# Patient Record
Sex: FEMALE | Race: WHITE | NOT HISPANIC OR LATINO | URBAN - METROPOLITAN AREA
[De-identification: names, ages, dates, MRNs, and addresses within clinical notes are randomized per-mention and may not be internally consistent; named-entity substitution may affect disease eponyms.]

---

## 2020-05-14 ENCOUNTER — OFFICE VISIT CONVERTED (OUTPATIENT)
Dept: INTERNAL MEDICINE | Facility: CLINIC | Age: 30
End: 2020-05-14
Attending: PHYSICIAN ASSISTANT

## 2020-08-10 ENCOUNTER — CONVERSION ENCOUNTER (OUTPATIENT)
Dept: INTERNAL MEDICINE | Facility: CLINIC | Age: 30
End: 2020-08-10

## 2020-10-28 ENCOUNTER — TELEMEDICINE CONVERTED (OUTPATIENT)
Dept: INTERNAL MEDICINE | Facility: CLINIC | Age: 30
End: 2020-10-28
Attending: PHYSICIAN ASSISTANT

## 2021-03-31 ENCOUNTER — HOSPITAL ENCOUNTER (OUTPATIENT)
Dept: OTHER | Facility: HOSPITAL | Age: 31
Discharge: HOME | End: 2021-03-31
Attending: PHYSICIAN ASSISTANT

## 2021-03-31 ENCOUNTER — CONVERSION ENCOUNTER (OUTPATIENT)
Dept: INTERNAL MEDICINE | Facility: CLINIC | Age: 31
End: 2021-03-31

## 2021-03-31 ENCOUNTER — OFFICE VISIT CONVERTED (OUTPATIENT)
Dept: INTERNAL MEDICINE | Facility: CLINIC | Age: 31
End: 2021-03-31
Attending: PHYSICIAN ASSISTANT

## 2021-03-31 LAB
ALBUMIN SERPL-MCNC: 4.2 G/DL (ref 3.5–5)
ALBUMIN/GLOB SERPL: 1.4 {RATIO} (ref 1.4–2.6)
ALP SERPL-CCNC: 67 U/L (ref 42–98)
ALT SERPL-CCNC: 21 U/L (ref 10–40)
ANION GAP SERPL CALC-SCNC: 18 MMOL/L (ref 8–19)
AST SERPL-CCNC: 26 U/L (ref 15–50)
BASOPHILS # BLD AUTO: 0.04 10*3/UL (ref 0–0.2)
BASOPHILS NFR BLD AUTO: 0.5 % (ref 0–3)
BILIRUB SERPL-MCNC: 0.36 MG/DL (ref 0.2–1.3)
BUN SERPL-MCNC: 7 MG/DL (ref 5–25)
BUN/CREAT SERPL: 9 {RATIO} (ref 6–20)
CALCIUM SERPL-MCNC: 9.3 MG/DL (ref 8.7–10.4)
CHLORIDE SERPL-SCNC: 103 MMOL/L (ref 99–111)
CHOLEST SERPL-MCNC: 239 MG/DL (ref 107–200)
CHOLEST/HDLC SERPL: 2.7 {RATIO} (ref 3–6)
CONV ABS IMM GRAN: 0.02 10*3/UL (ref 0–0.2)
CONV CO2: 21 MMOL/L (ref 22–32)
CONV IMMATURE GRAN: 0.3 % (ref 0–1.8)
CONV TOTAL PROTEIN: 7.3 G/DL (ref 6.3–8.2)
CREAT UR-MCNC: 0.81 MG/DL (ref 0.5–0.9)
DEPRECATED RDW RBC AUTO: 41.9 FL (ref 36.4–46.3)
EOSINOPHIL # BLD AUTO: 0.31 10*3/UL (ref 0–0.7)
EOSINOPHIL # BLD AUTO: 4.2 % (ref 0–7)
ERYTHROCYTE [DISTWIDTH] IN BLOOD BY AUTOMATED COUNT: 12.1 % (ref 11.7–14.4)
GFR SERPLBLD BASED ON 1.73 SQ M-ARVRAT: >60 ML/MIN/{1.73_M2}
GLOBULIN UR ELPH-MCNC: 3.1 G/DL (ref 2–3.5)
GLUCOSE SERPL-MCNC: 83 MG/DL (ref 65–99)
HCG UR QL: NEGATIVE
HCT VFR BLD AUTO: 42 % (ref 37–47)
HDLC SERPL-MCNC: 87 MG/DL (ref 40–60)
HGB BLD-MCNC: 14.2 G/DL (ref 12–16)
LDLC SERPL CALC-MCNC: 130 MG/DL (ref 70–100)
LYMPHOCYTES # BLD AUTO: 2.52 10*3/UL (ref 1–5)
LYMPHOCYTES NFR BLD AUTO: 34.2 % (ref 20–45)
MCH RBC QN AUTO: 31.3 PG (ref 27–31)
MCHC RBC AUTO-ENTMCNC: 33.8 G/DL (ref 33–37)
MCV RBC AUTO: 92.5 FL (ref 81–99)
MONOCYTES # BLD AUTO: 0.51 10*3/UL (ref 0.2–1.2)
MONOCYTES NFR BLD AUTO: 6.9 % (ref 3–10)
NEUTROPHILS # BLD AUTO: 3.96 10*3/UL (ref 2–8)
NEUTROPHILS NFR BLD AUTO: 53.9 % (ref 30–85)
NRBC CBCN: 0 % (ref 0–0.7)
OSMOLALITY SERPL CALC.SUM OF ELEC: 283 MOSM/KG (ref 273–304)
PLATELET # BLD AUTO: 270 10*3/UL (ref 130–400)
PMV BLD AUTO: 10.8 FL (ref 9.4–12.3)
POTASSIUM SERPL-SCNC: 3.9 MMOL/L (ref 3.5–5.3)
RBC # BLD AUTO: 4.54 10*6/UL (ref 4.2–5.4)
SODIUM SERPL-SCNC: 138 MMOL/L (ref 135–147)
TRIGL SERPL-MCNC: 110 MG/DL (ref 40–150)
TSH SERPL-ACNC: 1.89 M[IU]/L (ref 0.27–4.2)
VLDLC SERPL-MCNC: 22 MG/DL (ref 5–37)
WBC # BLD AUTO: 7.36 10*3/UL (ref 4.8–10.8)

## 2021-05-04 ENCOUNTER — CONVERSION ENCOUNTER (OUTPATIENT)
Dept: INTERNAL MEDICINE | Facility: CLINIC | Age: 31
End: 2021-05-04

## 2021-05-04 ENCOUNTER — OFFICE VISIT CONVERTED (OUTPATIENT)
Dept: INTERNAL MEDICINE | Facility: CLINIC | Age: 31
End: 2021-05-04
Attending: PHYSICIAN ASSISTANT

## 2021-05-10 NOTE — H&P
History and Physical      Patient Name: Angle Solano   Patient ID: [id number]   Sex: Female   YOB: 1990    Primary Care Provider: Mara Quiros PA-C    Visit Date: May 14, 2020    Provider: Mara Quiros PA-C   Location: [facility]   Location Address: [address] [place] [address], [address]  [place], [place]  [id number]   Location Phone: [phone]          Chief Complaint  · establish of care      History Of Present Illness  Angle Solano is a 30 year old female who presents for evaluation and treatment of:      [facility]  Has been on depo since 2018. Denies issues with medication.  She does not have period on depo.   Denies hx of blood clots in legs, migraines, seizures.  she is interested in Nexplanon.      Pt smoking 1/2-1 PPD. She is not ready to quit.    Allergies: controlled on allegra.   Previous doing allergy injections but stopped because [place] get on [place]  Sx have been ok recently    GERD: has severe sx if she misses Prilosec dose.          Past Medical History  Reviewed None Changed         Past Surgical History  Procedure Name Date Notes   Rhinoplasty 2009 polyp and deviated septum repaired         Medication List  Name Date Started Instructions   Allegra Allergy 180 mg oral tablet 05/14/2020 take 1 tablet (180 mg) by oral route once daily for 90 days         Allergy List  Allergen Name Date Reaction Notes   NO KNOWN DRUG ALLERGIES --  --  --        Allergies Reconciled  Family Medical History  Disease Name Relative/Age Notes   Hypertension Father/  Grandfather (paternal)/   --    Heart Attack (MI) Father/   --          Social History  Finding Status Start/Stop Quantity Notes   Smokes 1 pack of cigarettes per day --  --/-- --  --    Tobacco use --  --/-- --  1PPD         Review of Systems  · Constitutional  o Denies  o : fatigue, night sweats  · Eyes  o Denies  o : double vision, blurred vision  · HENT  o Denies  o : vertigo, recent head  "injury  · Cardiovascular  o Denies  o : chest pain, irregular heart beats  · Respiratory  o Denies  o : shortness of breath, productive cough  · Gastrointestinal  o Admits  o : heartburn, reflux  o Denies  o : nausea, vomiting  · Genitourinary  o Denies  o : dysuria, urinary retention  · Integument  o Denies  o : hair growth change, new skin lesions  · Neurologic  o Denies  o : altered mental status, seizures  · Musculoskeletal  o Denies  o : joint swelling, limitation of motion  · Endocrine  o Denies  o : cold intolerance, heat intolerance  · Heme-Lymph  o Denies  o : petechiae, lymph node enlargement or tenderness  · Allergic-Immunologic  o Admits  o : sinus allergy symptoms  o Denies  o : frequent illnesses      Vitals  Date Time BP Position Site L\R Cuff Size HR RR TEMP (F) WT  HT  BMI kg/m2 BSA m2 O2 Sat        05/14/2020 01:53 /74 Sitting    84 - R  97.8 133lbs 6oz 5'  2\" 24.39 1.63 99 %          Physical Examination  · Constitutional  o Appearance  o : no acute distress, well-nourished  · Head and Face  o Head  o :   § Inspection  § : atraumatic, normocephalic  · Eyes  o Eyes  o : extraocular movements intact, no scleral icterus, no conjunctival injection  · Ears, Nose, Mouth and Throat  o Ears  o :   § External Ears  § : normal  § Otoscopic Examination  § : tympanic membrane appearance within normal limits bilaterally  o Nose  o :   § Intranasal Exam  § : nares patent  o Oral Cavity  o :   § Oral Mucosa  § : moist mucous membranes  o Throat  o :   § Oropharynx  § : no inflammation or lesions present, tonsils within normal limits  · Neck  o Thyroid  o : gland size normal, nontender, no nodules or masses present on palpation, symmetric  · Respiratory  o Respiratory Effort  o : breathing comfortably, symmetric chest rise  o Auscultation of Lungs  o : clear to asculatation bilaterally, no wheezes, rales, or rhonchii  · Cardiovascular  o Heart  o :   § Auscultation of Heart  § : regular rate and rhythm, " no murmurs, rubs, or gallops  · Gastrointestinal  o Abdominal Examination  o :   § Abdomen  § : bowel sounds present, non-distended, non-tender  · Lymphatic  o Neck  o : no lymphadenopathy present  o Supraclavicular Nodes  o : no supraclavicular nodes  · Skin and Subcutaneous Tissue  o General Inspection  o : no lesions present, no areas of discoloration, skin turgor normal  · Neurologic  o Mental Status Examination  o :   § Orientation  § : grossly oriented to person, place and time  o Gait and Station  o :   § Gait Screening  § : normal gait  · Psychiatric  o General  o : normal mood and affect          Results  · In-Office Procedures  o Lab procedure  § Urine pregnancy test (16602)   § B-HCG Ur Ql: Negative   § Internal Control Verified?: Yes       Assessment  · Screening for depression     V79.0/Z13.89  negative  · Allergic rhinitis due to allergen     477.9/J30.9  Cont current meds  · GERD (gastroesophageal reflux disease)     530.81/K21.9  Discussed GERD symptoms, GERD diet, lifestyle changes (elevate head of bed, limit meals late at night, smaller more frequent meals). Encouraged food diary, avoid trigger foods.   · Nicotine dependence     305.1/F17.200  Discussed risks of smoking with patient including death, bp elevation, mi, stroke, CKD, blindness, slow wound healing. Pt is not ready to quit smoking at this time, encouraged to RTC once ready to quit.  · Contraception management     V25.9/Z30.9  Discussed different birth control options and side effects. Discussed risks of long term use of depo. Will give injection today. pt would like Nexplanon, rtc 3 months to insert Nexplanon. Pt understands and agrees    Problems Reconciled  Plan  · Orders  o Smoking cessation counseling, 3-10 minutes [facility] (23315) - 305.1/F17.200 - 05/14/2020  o ACO-17: Screened for tobacco use AND received tobacco cessation intervention (3914F) - 305.1/F17.200 - 05/14/2020  o IM/SQ - Injection Fee [facility] (19012) - - 05/15/2020  o ACO-39:  Current medications updated and reviewed () - - 05/14/2020  o ACO-18: Negative screen for clinical depression using a standardized tool () - - 05/15/2020  o 150.00 - Depo Provera 150mg (-15) - - 05/15/2020  · Medications  o omeprazole 10 mg oral capsule,delayed release(DR/EC)   SIG: take 1 capsule by oral route daily for 90 days   DISP: (90) capsules with 1 refills  Prescribed on 05/14/2020     o Allegra Allergy 180 mg oral tablet   SIG: take 1 tablet (180 mg) by oral route once daily for 90 days   DISP: (90) tablets with 1 refills  Prescribed on 05/14/2020     o Prilosec 10 mg oral susp,delayed release for recon   SIG: ---   DISP: (0) milligram with 0 refills  Discontinued on 05/14/2020     o Medications have been Reconciled  o Transition of Care or Provider Policy  · Instructions  o Depression Screen completed and scanned into the EMR under the designated folder within the patient's documents.  o Today's PHQ-9 result is 1  o *Form of nicotine being used: cigarette  o Patient was strongly encouraged to discontinue use of any nicotine containing product or minimize the use of the product.  o Patient was educated/instructed on their diagnosis, treatment and medications prior to discharge from the clinic today.  · Disposition  o Call or Return if symptoms worsen or persist.  o Follow up in 3 months            Electronically Signed by: Mara Quiros PA-C -Author on May 16, 2020 09:20:34 AM  Electronically Co-signed by: Viri Hutton MD -Reviewer on May 18, 2020 08:18:10 AM

## 2021-05-13 NOTE — PROGRESS NOTES
Progress Note      Patient Name: Angel Solano   Patient ID: [id number]   Sex: Female   YOB: 1990    Primary Care Provider: Mara Quiros PA-C    Visit Date: October 28, 2020    Provider: Comfort Conte PA-C   Location: [facility]   Location Address: [address] [place] [address], [facility] [address]  [place], [place]  [id number]   Location Phone: [phone]          Chief Complaint  · birth control      History Of Present Illness  Video Conferencing Visit  Angle Solano is a 30 year old female who is presenting for evaluation via video conferencing via [employer]. Verbal consent obtained before beginning visit.   The following staff were present during this visit: Comfort Conte PA-C   Angle Solano is a 30 year old female who presents for evaluation and treatment of:      birth control.  Patient is interested in OCP as she waits to get Nexplanon.  Patient states that she is still smoking and not interested in quitting.       Past Surgical History  Procedure Name Date Notes   Rhinoplasty 2009 polyp and deviated septum repaired         Medication List  Name Date Started Instructions   Allegra Allergy 180 mg oral tablet 05/14/2020 take 1 tablet (180 mg) by oral route once daily for 90 days   omeprazole 10 mg oral capsule,delayed release(/EC) 05/14/2020 take 1 capsule by oral route daily for 90 days         Allergy List  Allergen Name Date Reaction Notes   NO KNOWN DRUG ALLERGIES --  --  --        Allergies Reconciled  Family Medical History  Disease Name Relative/Age Notes   Hypertension Father/  Grandfather (paternal)/   --    Heart Attack (MI) Father/   --          Social History  Finding Status Start/Stop Quantity Notes   Smokes 1 pack of cigarettes per day --  --/-- --  --    Tobacco use --  --/-- --  1PPD         Review of Systems  · Constitutional  o Denies  o : fever, fatigue, weight loss, weight gain  · Cardiovascular  o Denies  o : lower extremity edema, claudication, chest pressure,  palpitations  · Respiratory  o Denies  o : shortness of breath, wheezing, frequent cough, hemoptysis, dyspnea on exertion  · Gastrointestinal  o Denies  o : nausea, vomiting, diarrhea, constipation, abdominal pain      Physical Examination     Gen: well-nourished, no acute distress  HENT: atraumatic, normocephalic  Eyes: extraocular movements intact, no scleral icterus  Lung: breathing comfortably, no cough  Skin: no visible rash, no lesions  Neuro: grossly oriented to person, place, and time. no facial droop   Psych: normal mood and affect           Assessment  · Contraceptive management     V25.9/Z30.9  Discussed risk of blood clot due to smoking and age risk factors with OCP. Patient understands and would like to continue with OCP until nexplanon can be implanted.    Problems Reconciled  Plan  · Orders  o ACO-39: Current medications updated and reviewed (1159F, ) - - 10/28/2020  · Medications  o LoSeasonique 0.10 mg-20 mcg (84)/10 mcg (7) oral tablets,dose pack,3 month   SIG: take 1 tablet by oral route once daily for 90 days   DISP: (90) Tablet with 0 refills  Prescribed on 10/28/2020     o Medications have been Reconciled  o Transition of Care or Provider Policy  · Instructions  o Take all medications as prescribed/directed.  o Patient was educated/instructed on their diagnosis, treatment and medications prior to discharge from the clinic today.  o Patient instructed to seek medical attention urgently for new or worsening symptoms.  o Call the office with any concerns or questions.  · Disposition  o Call or Return if symptoms worsen or persist.  o follow up as needed  o Medications sent electronically to pharmacy            Electronically Signed by: Comfort Conte PA-C -Author on October 29, 2020 08:14:37 AM

## 2021-05-14 VITALS
OXYGEN SATURATION: 100 % | TEMPERATURE: 97.8 F | SYSTOLIC BLOOD PRESSURE: 124 MMHG | BODY MASS INDEX: 24.66 KG/M2 | HEIGHT: 62 IN | WEIGHT: 134 LBS | DIASTOLIC BLOOD PRESSURE: 82 MMHG | HEART RATE: 82 BPM

## 2021-05-14 NOTE — PROGRESS NOTES
"   Progress Note      Patient Name: Angle Solano   Patient ID: [id number]   Sex: Female   YOB: 1990    Primary Care Provider: Mara Quiros PA-C    Visit Date: March 31, 2021    Provider: Comfort Conte PA-C   Location: [facility]   Location Address: [address] [place] [address], [facility]  [place], [place]  [id number]   Location Phone: [phone]          Chief Complaint  · Follow-up  · \"wants to change her BC\"      History Of Present Illness  Angle Solano is a 30 year old female who presents for evaluation and treatment of:      OCP- does not do well taking it regularly, emotionally not as stable as she would like to be while taking medication.  Will sometimes have hot flashes.  3 years since last pap.    Smoking- currently smoking 1/2 ppd.  Admits to stressors at work and home.  Pt is interested in starting anti-depressive medication at this time.  She denies SI/HI.               Past Surgical History  Procedure Name Date Notes   Rhinoplasty 2009 polyp and deviated septum repaired         Medication List  Name Date Started Instructions   Allegra Allergy 180 mg oral tablet 01/05/2021 take 1 tablet (180 mg) by oral route once daily for 90 days   Jolessa 0.15 mg-30 mcg (91) oral tablets,dose pack,3 month 11/06/2020 take 1 tablet by oral route once daily for 90 days   omeprazole 10 mg oral capsule,delayed release(/EC) 01/05/2021 take 1 capsule by oral route daily for 90 days         Allergy List  Allergen Name Date Reaction Notes   NO KNOWN DRUG ALLERGIES --  --  --        Allergies Reconciled  Family Medical History  Disease Name Relative/Age Notes   Hypertension Father/  Grandfather (paternal)/   --    Heart Attack (MI) Father/   --          Social History  Finding Status Start/Stop Quantity Notes   Smokes 1 pack of cigarettes per day --  --/-- --  --    Tobacco use --  --/-- --  1PPD         Review of Systems  · Constitutional  o Denies  o : fever, fatigue, weight loss, weight " "gain  · Cardiovascular  o Denies  o : lower extremity edema, claudication, chest pressure, palpitations  · Respiratory  o Denies  o : shortness of breath, wheezing, cough, hemoptysis, dyspnea on exertion  · Gastrointestinal  o Denies  o : nausea, vomiting, diarrhea, constipation, abdominal pain      Vitals  Date Time BP Position Site L\R Cuff Size HR RR TEMP (F) WT  HT  BMI kg/m2 BSA m2 O2 Sat FR L/min FiO2        03/31/2021 01:31 /82 Sitting    82 - R  97.8 134lbs 0oz 5'  2\" 24.51 1.63 100 %  21%          Physical Examination  · Constitutional  o Appearance  o : no acute distress, well-nourished  · Head and Face  o Head  o :   § Inspection  § : atraumatic, normocephalic  · Eyes  o Eyes  o : extraocular movements intact, no scleral icterus, no conjunctival injection  · Ears, Nose, Mouth and Throat  o Ears  o :   § External Ears  § : normal  § Otoscopic Examination  § : tympanic membrane appearance within normal limits bilaterally  o Nose  o :   § Intranasal Exam  § : nares patent  o Oral Cavity  o :   § Oral Mucosa  § : moist mucous membranes  · Respiratory  o Respiratory Effort  o : breathing comfortably, symmetric chest rise  o Auscultation of Lungs  o : clear to asculatation bilaterally, no wheezes, rales, or rhonchii  · Cardiovascular  o Heart  o :   § Auscultation of Heart  § : regular rate and rhythm, no murmurs, rubs, or gallops  o Peripheral Vascular System  o :   § Extremities  § : no edema  · Lymphatic  o Neck  o : no lymphadenopathy present  · Skin and Subcutaneous Tissue  o General Inspection  o : no lesions present, no areas of discoloration, skin turgor normal  · Neurologic  o Mental Status Examination  o :   § Orientation  § : grossly oriented to person, place and time  o Gait and Station  o :   § Gait Screening  § : normal gait  · Psychiatric  o General  o : normal mood and affect          Results  · In-Office Procedures  o Lab procedure  § IOP - Urine Pregnancy Test (65061)   § B-HCG Ur Ql: " Negative   § Internal Control Verified?: Yes       Assessment  · Anxiety disorder     300.00/F41.9  Will go ahead and start patient on Zoloft. Discussed risk of SI while starting medication. Pt is to call our office or suicide hotline if this happens. List of local counselors given to patient. Will have her follow up in 1 month.  · Nicotine dependence     305.1/F17.200  Recommend decreasing cigarettes by 1 each week while weaning down smoking. Smoking cessation possibly may improve if we could get stress under more manageable level.  · Oral contraceptive use     V25.41/Z30.41  Will try the patch at this time but patient is interested in IUD so will send her to GYN to discuss this and for pap smear. Discussed risk of blood clots while smoking and on HRT. Patient voiced understanding.      Plan  · Orders  o ACO-17: Screened for tobacco use AND received tobacco cessation intervention (4004F) - 305.1/F17.200 - 03/31/2021  o Physical, Primary Care Panel (CBC, CMP, Lipid, TSH) [facility] (32861, 66644, 92846, 73848) - 305.1/F17.200, V25.41/Z30.41 - 03/31/2021   Non-fasting  o ACO-39: Current medications updated and reviewed (1159F, ) - - 03/31/2021  o OB/GYN CONSULTATION (OBGYN) - V25.41/Z30.41 - 03/31/2021  · Medications  o Zoloft 25 mg oral tablet   SIG: take 1 tablet (25 mg) by oral route once daily for 30 days   DISP: (30) Tablet with 1 refills  Prescribed on 03/31/2021     o Xulane 150-35 mcg/24 hr transdermal patch weekly   SIG: apply 1 patch by transdermal route once weekly for 3 weeks   DISP: (3) Patch with 1 refills  Prescribed on 03/31/2021     o Medications have been Reconciled  o Transition of Care or Provider Policy  · Instructions  o Discussed the need for therapy, either with a certified counselor, psychologist, and/or family . If no improvement is noted or worsening of their condition, return to office or ER. But also discussed with patient that if they are non-responsive to the type of medication  they may need to see a psychiatrist for further evaluation and management.  o Patient was given an SSRI/SSNRI medication and warned of possible side effects of the medication including potential for increased risk of suicidal thoughts and feelings. Patient was instructed that if they begin to exhibit any of these effects they will discontinue the medication immediately and contact our office or the ER ASAP.  o *Form of nicotine being used:   o Patient was strongly encouraged to discontinue use of any nicotine containing product or minimize the use of the product.  o Take all medications as prescribed/directed.  o Patient was educated/instructed on their diagnosis, treatment and medications prior to discharge from the clinic today.  o Patient instructed to seek medical attention urgently for new or worsening symptoms.  o Call the office with any concerns or questions.  · Disposition  o Call or Return if symptoms worsen or persist.  o follow up in 1 month  o Medications sent electronically to pharmacy  o Labs drawn in clinic            Electronically Signed by: Comfort Conte PA-C -Author on March 31, 2021 09:45:55 PM

## 2021-05-15 VITALS
TEMPERATURE: 97.8 F | WEIGHT: 133.37 LBS | HEART RATE: 84 BPM | OXYGEN SATURATION: 99 % | DIASTOLIC BLOOD PRESSURE: 74 MMHG | SYSTOLIC BLOOD PRESSURE: 120 MMHG | HEIGHT: 62 IN | BODY MASS INDEX: 24.54 KG/M2

## 2021-05-15 VITALS
SYSTOLIC BLOOD PRESSURE: 118 MMHG | TEMPERATURE: 98.3 F | DIASTOLIC BLOOD PRESSURE: 78 MMHG | BODY MASS INDEX: 24.2 KG/M2 | WEIGHT: 131.5 LBS | HEIGHT: 62 IN | OXYGEN SATURATION: 96 % | HEART RATE: 98 BPM

## 2021-05-26 PROBLEM — R05.9 COUGH: Status: ACTIVE | Noted: 2021-05-26

## 2021-06-06 NOTE — PROGRESS NOTES
"   Progress Note      Patient Name: Angle Solano   Patient ID: [id number]   Sex: Female   YOB: 1990    Primary Care Provider: Mara Quiros PA-C    Visit Date: May 4, 2021    Provider: Comfort Conte PA-C   Location: [facility]   Location Address: [address] [place] [address] 3  [place], [place]  [id number]   Location Phone: [phone]          Chief Complaint  · Follow-up  · No concerns, refills on b.c and zoloft  · \"zoloft seems to be helping\"      History Of Present Illness  Angle Solano is a 31 year old female who presents for evaluation and treatment of:      Zoloft- doing well on 25mg, would like to continue this for now.  Occasionally will have issues with increased stress at work but is able to focus more at work.  No SI/HI.     Birth control- doing well, discussed risk of blood clots while on OCP and smoking.    smoking 10 cig daily, interested in quitting and would like to try lozenges.       Past Surgical History  Procedure Name Date Notes   Rhinoplasty 2009 polyp and deviated septum repaired         Medication List  Name Date Started Instructions   Allegra Allergy 180 mg oral tablet 04/14/2021 take 1 tablet (180 mg) by oral route once daily for 90 days   Jolessa 0.15 mg-30 mcg (91) oral tablets,dose pack,3 month 06/02/2021 take 1 tablet by oral route once daily for 90 days   omeprazole 10 mg oral capsule,delayed release(DR/EC) 04/14/2021 take 1 capsule by oral route daily for 90 days   Xulane 150-35 mcg/24 hr transdermal patch weekly 06/02/2021 apply 1 patch by transdermal route once weekly for 3 weeks   Zoloft 25 mg oral tablet 03/31/2021 take 1 tablet (25 mg) by oral route once daily for 30 days         Allergy List  Allergen Name Date Reaction Notes   NO KNOWN DRUG ALLERGIES --  --  --          Family Medical History  Disease Name Relative/Age Notes   Hypertension Father/  Grandfather (paternal)/   --    Heart Attack (MI) Father/   --          Social History  Finding " "Status Start/Stop Quantity Notes   Smokes 1 pack of cigarettes per day --  --/-- --  --    Tobacco use --  --/-- --  1PPD         Review of Systems  · Constitutional  o Denies  o : fever, fatigue, weight loss, weight gain  · Cardiovascular  o Denies  o : lower extremity edema, claudication, chest pressure, palpitations  · Respiratory  o Denies  o : shortness of breath, wheezing, cough, hemoptysis, dyspnea on exertion  · Gastrointestinal  o Denies  o : nausea, vomiting, diarrhea, constipation, abdominal pain      Vitals  Date Time BP Position Site L\R Cuff Size HR RR TEMP (F) WT  HT  BMI kg/m2 BSA m2 O2 Sat FR L/min FiO2 HC       05/04/2021 03:03 /70 Sitting    93 - R  98.2 126lbs 16oz 5'  2\" 23.23 1.59 98 %  21%          Physical Examination  · Constitutional  o Appearance  o : no acute distress, well-nourished  · Head and Face  o Head  o :   § Inspection  § : atraumatic, normocephalic  · Eyes  o Eyes  o : extraocular movements intact, no scleral icterus, no conjunctival injection  · Ears, Nose, Mouth and Throat  o Ears  o :   § External Ears  § : normal  o Nose  o :   § Intranasal Exam  § : nares patent  o Oral Cavity  o :   § Oral Mucosa  § : moist mucous membranes  · Respiratory  o Respiratory Effort  o : breathing comfortably, symmetric chest rise  o Auscultation of Lungs  o : clear to asculatation bilaterally, no wheezes, rales, or rhonchii  · Cardiovascular  o Heart  o :   § Auscultation of Heart  § : regular rate and rhythm, no murmurs, rubs, or gallops  o Peripheral Vascular System  o :   § Extremities  § : no edema  · Lymphatic  o Neck  o : no lymphadenopathy present  · Skin and Subcutaneous Tissue  o General Inspection  o : no lesions present, no areas of discoloration, skin turgor normal  · Neurologic  o Mental Status Examination  o :   § Orientation  § : grossly oriented to person, place and time  o Gait and Station  o :   § Gait Screening  § : normal gait  · Psychiatric  o General  o : normal mood " and affect          Assessment  · Anxiety disorder     300.00/F41.9  Doing well with Zoloft, will continue this for now. Could consider increasing dose if needed.   · Nicotine dependence     305.1/F17.200  Will send in lozenges. Encouraged pt do decrease cigarettes daily. Smoking cessation goal discussed.       Plan  · Orders  o ACO-17: Screened for tobacco use AND received tobacco cessation intervention (4004F) - 305.1/F17.200 - 05/04/2021  o ACO-39: Current medications updated and reviewed (, 1159F) - - 05/04/2021  · Medications  o Nicorette 2 mg buccal lozenge   SIG: place 1 tablet (2 mg) by buccal route every 1-2 hours PRN   DISP: (300) Lozenge with 0 refills  Prescribed on 05/04/2021     o Medications have been Reconciled  o Transition of Care or Provider Policy  · Instructions  o Discussed the need for therapy, either with a certified counselor, psychologist, and/or family . If no improvement is noted or worsening of their condition, return to office or ER. But also discussed with patient that if they are non-responsive to the type of medication they may need to see a psychiatrist for further evaluation and management.  o Patient was given an SSRI/SSNRI medication and warned of possible side effects of the medication including potential for increased risk of suicidal thoughts and feelings. Patient was instructed that if they begin to exhibit any of these effects they will discontinue the medication immediately and contact our office or the ER ASAP.  o *Form of nicotine being used:   o Patient was strongly encouraged to discontinue use of any nicotine containing product or minimize the use of the product.  o Take all medications as prescribed/directed.  o Patient was educated/instructed on their diagnosis, treatment and medications prior to discharge from the clinic today.  o Patient instructed to seek medical attention urgently for new or worsening symptoms.  o Call the office with any concerns or  questions.  · Disposition  o follow up in 1 month            Electronically Signed by: Comfort Conte PA-C -Author on Dodie 3, 2021 01:04:08 PM

## 2021-06-08 ENCOUNTER — OFFICE VISIT (OUTPATIENT)
Dept: INTERNAL MEDICINE | Facility: CLINIC | Age: 31
End: 2021-06-08

## 2021-06-08 VITALS
TEMPERATURE: 97.8 F | BODY MASS INDEX: 24.44 KG/M2 | HEART RATE: 57 BPM | SYSTOLIC BLOOD PRESSURE: 116 MMHG | HEIGHT: 62 IN | DIASTOLIC BLOOD PRESSURE: 66 MMHG | OXYGEN SATURATION: 98 % | WEIGHT: 132.8 LBS

## 2021-06-08 DIAGNOSIS — K21.9 GASTROESOPHAGEAL REFLUX DISEASE WITHOUT ESOPHAGITIS: ICD-10-CM

## 2021-06-08 DIAGNOSIS — F41.9 ANXIETY: ICD-10-CM

## 2021-06-08 DIAGNOSIS — M25.562 LEFT KNEE PAIN, UNSPECIFIED CHRONICITY: Primary | ICD-10-CM

## 2021-06-08 DIAGNOSIS — F17.210 CIGARETTE NICOTINE DEPENDENCE, UNCOMPLICATED: ICD-10-CM

## 2021-06-08 DIAGNOSIS — J30.2 SEASONAL ALLERGIC RHINITIS, UNSPECIFIED TRIGGER: ICD-10-CM

## 2021-06-08 PROBLEM — R05.9 COUGH: Status: RESOLVED | Noted: 2021-05-26 | Resolved: 2021-06-08

## 2021-06-08 PROCEDURE — 99213 OFFICE O/P EST LOW 20 MIN: CPT | Performed by: PHYSICIAN ASSISTANT

## 2021-06-08 RX ORDER — FEXOFENADINE HCL 180 MG/1
1 TABLET ORAL DAILY
COMMUNITY
Start: 2021-04-19 | End: 2021-06-08 | Stop reason: SDUPTHER

## 2021-06-08 RX ORDER — SERTRALINE HYDROCHLORIDE 25 MG/1
25 TABLET, FILM COATED ORAL DAILY
Qty: 90 TABLET | Refills: 1 | Status: SHIPPED | OUTPATIENT
Start: 2021-06-08 | End: 2022-01-18 | Stop reason: SDUPTHER

## 2021-06-08 RX ORDER — FEXOFENADINE HCL 180 MG/1
180 TABLET ORAL DAILY
Qty: 90 TABLET | Refills: 1 | Status: SHIPPED | OUTPATIENT
Start: 2021-06-08 | End: 2022-01-18 | Stop reason: SDUPTHER

## 2021-06-08 RX ORDER — SERTRALINE HYDROCHLORIDE 25 MG/1
1 TABLET, FILM COATED ORAL DAILY
COMMUNITY
Start: 2021-04-29 | End: 2021-06-08 | Stop reason: SDUPTHER

## 2021-06-08 RX ORDER — OMEPRAZOLE 10 MG/1
20 CAPSULE, DELAYED RELEASE ORAL DAILY
Qty: 90 CAPSULE | Refills: 1 | Status: SHIPPED | OUTPATIENT
Start: 2021-06-08 | End: 2022-01-18 | Stop reason: SDUPTHER

## 2021-06-08 RX ORDER — POLYETHYLENE GLYCOL 3350 17 G
2 POWDER IN PACKET (EA) ORAL AS NEEDED
Qty: 168 LOZENGE | Refills: 2 | Status: SHIPPED | OUTPATIENT
Start: 2021-06-08 | End: 2021-08-27

## 2021-06-08 RX ORDER — OMEPRAZOLE 10 MG/1
1 CAPSULE, DELAYED RELEASE ORAL DAILY
COMMUNITY
Start: 2021-04-19 | End: 2021-06-08 | Stop reason: SDUPTHER

## 2021-06-08 RX ORDER — POLYETHYLENE GLYCOL 3350 17 G
2 POWDER IN PACKET (EA) ORAL AS NEEDED
Qty: 168 LOZENGE | Refills: 2 | Status: SHIPPED | OUTPATIENT
Start: 2021-06-08 | End: 2021-06-08 | Stop reason: SDUPTHER

## 2021-06-08 RX ORDER — LEVONORGESTREL AND ETHINYL ESTRADIOL 0.15-0.03
1 KIT ORAL DAILY
COMMUNITY
Start: 2021-06-02 | End: 2021-07-26 | Stop reason: SDUPTHER

## 2021-06-08 NOTE — PROGRESS NOTES
"Chief Complaint  Anxiety, Nicotine Dependence, and Knee Pain (Left, Fell a few months ago, still has a spot there. )    Subjective          Angle Solano presents to [facility]  Anxiety- doing well on it, still taking 25mg and it seems to be helping.  Anger outburst have improved greatly.      Smoking- less than 1/2ppd, would like to try logenzes again.     Knee pain- fall 2 months ago, still having a \"lump\" on knee.  It does not hurt.  It hadn't bothered her initially but it feels numb when she touches it.  Has not changed color.  Appears to be the same just not going away.         Objective   Vital Signs:   /66 (BP Location: Left arm, Cuff Size: Adult)   Pulse 57   Temp 97.8 °F (36.6 °C) (Temporal)   Ht 157.5 cm (62\")   Wt 60.2 kg (132 lb 12.8 oz)   SpO2 98%   BMI 24.29 kg/m²     Physical Exam  Vitals reviewed.   Constitutional:       Appearance: Normal appearance. She is well-developed.   HENT:      Head: Normocephalic and atraumatic.      Mouth/Throat:      Pharynx: No oropharyngeal exudate.   Eyes:      Conjunctiva/sclera: Conjunctivae normal.      Pupils: Pupils are equal, round, and reactive to light.   Cardiovascular:      Rate and Rhythm: Normal rate and regular rhythm.      Heart sounds: No murmur heard.   No friction rub. No gallop.    Pulmonary:      Effort: Pulmonary effort is normal.      Breath sounds: Normal breath sounds. No wheezing or rhonchi.   Abdominal:      General: There is no distension.      Tenderness: There is no abdominal tenderness.   Musculoskeletal:      Comments: MSK- palpable mass on L proximal fibular head without discolorations or edema   Skin:     General: Skin is warm and dry.   Neurological:      Mental Status: She is alert and oriented to person, place, and time.      Cranial Nerves: No cranial nerve deficit.   Psychiatric:         Mood and Affect: Mood and affect normal.         Behavior: Behavior normal.         " Thought Content: Thought content normal.         Judgment: Judgment normal.        Result Review :          Assessment and Plan    Diagnoses and all orders for this visit:    1. Left knee pain, unspecified chronicity (Primary)  Assessment & Plan:  Will get xray in office today.  If symptoms persist or worsen pt needs to return.     Orders:  -     XR Knee 3 View Left; Future    2. Anxiety  Assessment & Plan:  Doing well with Zoloft 25mg, will continue this.      3. Gastroesophageal reflux disease without esophagitis  Assessment & Plan:  Not as controlled as patient would like, will increase from 10mg to 20mg daily.  Monitor diet and stress level.       4. Seasonal allergic rhinitis, unspecified trigger  Assessment & Plan:  Managed well, will refill allegra      5. Cigarette nicotine dependence, uncomplicated  Assessment & Plan:  Currently smoking 1/2ppd or less, will send in Lozenges and encourage patient to decrease cigarettes to eventually 0.      Other orders  -     Cancel: XR Knee 1 or 2 View Left (In Office)  -     fexofenadine (ALLEGRA) 180 MG tablet; Take 1 tablet by mouth Daily.  Dispense: 90 tablet; Refill: 1  -     omeprazole (prilOSEC) 10 MG capsule; Take 2 capsules by mouth Daily.  Dispense: 90 capsule; Refill: 1  -     sertraline (ZOLOFT) 25 MG tablet; Take 1 tablet by mouth Daily.  Dispense: 90 tablet; Refill: 1  -     Discontinue: nicotine polacrilex (COMMIT) 2 MG lozenge; Dissolve 1 lozenge in the mouth As Needed for Smoking Cessation for up to 80 days.  Dispense: 168 lozenge; Refill: 2  -     nicotine polacrilex (COMMIT) 2 MG lozenge; Dissolve 1 lozenge in the mouth As Needed for Smoking Cessation for up to 80 days.  Dispense: 168 lozenge; Refill: 2      Follow Up   Return in about 3 months (around 9/8/2021).  Patient was given instructions and counseling regarding her condition or for health maintenance advice. Please see specific information pulled into the AVS if appropriate.

## 2021-06-08 NOTE — ASSESSMENT & PLAN NOTE
Currently smoking 1/2ppd or less, will send in Lozenges and encourage patient to decrease cigarettes to eventually 0.

## 2021-06-08 NOTE — ASSESSMENT & PLAN NOTE
Not as controlled as patient would like, will increase from 10mg to 20mg daily.  Monitor diet and stress level.

## 2021-07-15 VITALS
HEIGHT: 62 IN | WEIGHT: 127 LBS | OXYGEN SATURATION: 98 % | BODY MASS INDEX: 23.37 KG/M2 | SYSTOLIC BLOOD PRESSURE: 120 MMHG | DIASTOLIC BLOOD PRESSURE: 70 MMHG | HEART RATE: 93 BPM | TEMPERATURE: 98.2 F

## 2021-07-26 RX ORDER — NORELGESTROMIN AND ETHINYL ESTRADIOL 150; 35 UG/D; UG/D
PATCH TRANSDERMAL
COMMUNITY
Start: 2021-06-29 | End: 2021-07-27

## 2021-07-26 NOTE — TELEPHONE ENCOUNTER
Caller: Angle Solano    Relationship: Self    Best call back number: [phone]   Medication needed:   levonorgestrel-ethinyl estradiol (Jolessa) 0.15-0.03 MG per tablet    What is the patient's preferred pharmacy: [facility] [place], [place] - [address] [facility] - [phone]  - [phone] FX     PATIENT STATED THAT SHE IS WORKING ON RECEIVING A DIFFERENT FROM OF BIRTH CONTROL BUT IT HAS BEEN DELAYED THROUGH INSURANCE SO SHE WILL NEED ANOTHER REFILL OF THIS FORM WHILE HSE WAITS TO RECIEVE THE NEW ONE

## 2021-07-26 NOTE — TELEPHONE ENCOUNTER
Called and confirmed dosing with the pharmacy this is correct. Does the medication not let you send it?

## 2021-07-27 RX ORDER — LEVONORGESTREL AND ETHINYL ESTRADIOL 0.15-0.03
1 KIT ORAL DAILY
Qty: 28 TABLET | Refills: 3 | Status: SHIPPED | OUTPATIENT
Start: 2021-07-27 | End: 2021-08-02

## 2021-08-02 RX ORDER — LEVONORGESTREL AND ETHINYL ESTRADIOL 0.15-0.03
1 KIT ORAL DAILY
Qty: 91 TABLET | Refills: 0 | Status: SHIPPED | OUTPATIENT
Start: 2021-08-02

## 2021-08-30 ENCOUNTER — LAB (OUTPATIENT)
Dept: LAB | Facility: HOSPITAL | Age: 31
End: 2021-08-30

## 2021-08-30 ENCOUNTER — TRANSCRIBE ORDERS (OUTPATIENT)
Dept: LAB | Facility: HOSPITAL | Age: 31
End: 2021-08-30

## 2021-08-30 DIAGNOSIS — Z97.5 IUD CONTRACEPTION: ICD-10-CM

## 2021-08-30 DIAGNOSIS — Z97.5 IUD CONTRACEPTION: Primary | ICD-10-CM

## 2021-08-30 PROCEDURE — 84703 CHORIONIC GONADOTROPIN ASSAY: CPT

## 2021-08-30 PROCEDURE — 36415 COLL VENOUS BLD VENIPUNCTURE: CPT

## 2021-08-31 LAB — HCG SERPL QL: NEGATIVE

## 2022-01-18 RX ORDER — FEXOFENADINE HCL 180 MG/1
180 TABLET ORAL DAILY
Qty: 90 TABLET | Refills: 1 | Status: SHIPPED | OUTPATIENT
Start: 2022-01-18

## 2022-01-18 RX ORDER — OMEPRAZOLE 10 MG/1
20 CAPSULE, DELAYED RELEASE ORAL DAILY
Qty: 90 CAPSULE | Refills: 1 | Status: SHIPPED | OUTPATIENT
Start: 2022-01-18

## 2022-01-18 RX ORDER — SERTRALINE HYDROCHLORIDE 25 MG/1
25 TABLET, FILM COATED ORAL DAILY
Qty: 90 TABLET | Refills: 1 | Status: SHIPPED | OUTPATIENT
Start: 2022-01-18

## 2022-01-18 NOTE — TELEPHONE ENCOUNTER
Caller: Angle Solano    Relationship: Self    Best call back number: [phone]    Requested Prescriptions:   Requested Prescriptions     Pending Prescriptions Disp Refills   • sertraline (ZOLOFT) 25 MG tablet 90 tablet 1     Sig: Take 1 tablet by mouth Daily.   • omeprazole (prilOSEC) 10 MG capsule 90 capsule 1     Sig: Take 2 capsules by mouth Daily.   • fexofenadine (ALLEGRA) 180 MG tablet 90 tablet 1     Sig: Take 1 tablet by mouth Daily.        Pharmacy where request should be sent: [facility]  [place] [place] - [address] [phone] [facility] [phone] FX     Additional details provided by patient: PATIENT IS MOVING AND IS REQUESTING 90 DAY SUPPLY.     Does the patient have less than a 3 day supply:  [] Yes  [x] No    Henrique Jarquin Rep   01/18/22 14:41 EST